# Patient Record
Sex: FEMALE | Race: WHITE | NOT HISPANIC OR LATINO | Employment: OTHER | URBAN - METROPOLITAN AREA
[De-identification: names, ages, dates, MRNs, and addresses within clinical notes are randomized per-mention and may not be internally consistent; named-entity substitution may affect disease eponyms.]

---

## 2017-05-24 ENCOUNTER — HOSPITAL ENCOUNTER (OUTPATIENT)
Facility: MEDICAL CENTER | Age: 22
End: 2017-05-24
Attending: NURSE PRACTITIONER
Payer: COMMERCIAL

## 2017-05-24 ENCOUNTER — OFFICE VISIT (OUTPATIENT)
Dept: MEDICAL GROUP | Facility: MEDICAL CENTER | Age: 22
End: 2017-05-24
Payer: COMMERCIAL

## 2017-05-24 VITALS
SYSTOLIC BLOOD PRESSURE: 112 MMHG | DIASTOLIC BLOOD PRESSURE: 54 MMHG | HEIGHT: 62 IN | BODY MASS INDEX: 28.16 KG/M2 | WEIGHT: 153 LBS | TEMPERATURE: 98.3 F | OXYGEN SATURATION: 100 % | HEART RATE: 74 BPM

## 2017-05-24 DIAGNOSIS — N89.8 VAGINAL IRRITATION: ICD-10-CM

## 2017-05-24 PROCEDURE — 99214 OFFICE O/P EST MOD 30 MIN: CPT | Performed by: NURSE PRACTITIONER

## 2017-05-24 PROCEDURE — 87591 N.GONORRHOEAE DNA AMP PROB: CPT

## 2017-05-24 PROCEDURE — 87491 CHLMYD TRACH DNA AMP PROBE: CPT

## 2017-05-24 PROCEDURE — 87510 GARDNER VAG DNA DIR PROBE: CPT

## 2017-05-24 PROCEDURE — 87480 CANDIDA DNA DIR PROBE: CPT

## 2017-05-24 PROCEDURE — 87660 TRICHOMONAS VAGIN DIR PROBE: CPT

## 2017-05-24 NOTE — MR AVS SNAPSHOT
"        Maria Luisa Jett   2017 3:00 PM   Office Visit   MRN: [id number]    Department:  [facility]   Dept Phone:  [phone]    Description:  Female : 1995   Provider:  RUBEN Camarena           Reason for Visit     Follow-Up x1 week w/pain.       Allergies as of 2017     Allergen Noted Reactions    Codeine 2008   Vomiting      Vital Signs     Blood Pressure Pulse Temperature Height Weight Body Mass Index    112/54 mmHg 74 36.8 °C (98.3 °F) 1.575 m (5' 2\") 69.4 kg (153 lb) 27.98 kg/m2    Oxygen Saturation Smoking Status                100% Never Smoker           Basic Information     Date Of Birth Sex Race Ethnicity Preferred Language    1995 Female White Non- English      Problem List              ICD-10-CM Priority Class Noted - Resolved    History of asthma Z87.09   9/10/2012 - Present    History of hypothyroidism Z86.39   2016 - Present    Uses birth control Z30.9   2016 - Present      Health Maintenance        Date Due Completion Dates    IMM VARICELLA (CHICKENPOX) VACCINE (2 of 2 - 2 Dose Childhood Series) 1997    PAP SMEAR 10/20/2017 10/20/2014 (Done), 2013, 9/10/2012    Override on 10/20/2014: Done    IMM DTaP/Tdap/Td Vaccine (5 - Td) 2026, 2013, 2005, 1997, 1995, 1994, 1993            Current Immunizations     DTP 1997, 1994, 1993    Dtap Vaccine 1995    HPV Quadrivalent Vaccine (GARDASIL) 2008, 2007, 10/9/2007    Hepatitis A Vaccine, Ped/Adol 2008, 10/9/2007    Hepatitis B Vaccine Non-Recombivax (Ped/Adol) 1994, 1994, 1993    Hib Vaccine (Prp-d) Historical Data 1995, 3/29/1994, 1994, 1993    MMR Vaccine 1997, 10/7/1994    Meningococcal Conjugate Vaccine MCV4 (Menactra) 2016, 10/9/2007    OPV - Historical Data 1997, 1994, 1994, 1993    Tdap Vaccine 2016, 2013    Varicella " Vaccine Live 9/30/1997    dT Vaccine 9/23/2005      Below and/or attached are the medications your provider expects you to take. Review all of your home medications and newly ordered medications with your provider and/or pharmacist. Follow medication instructions as directed by your provider and/or pharmacist. Please keep your medication list with you and share with your provider. Update the information when medications are discontinued, doses are changed, or new medications (including over-the-counter products) are added; and carry medication information at all times in the event of emergency situations     Allergies:  CODEINE - Vomiting               Medications  Valid as of: May 24, 2017 -  3:18 PM    Generic Name Brand Name Tablet Size Instructions for use    Levothyroxine Sodium (Tab) SYNTHROID 50 MCG TAKE 1 TABLET BY MOUTH EVERY DAY        maalox plus-benadryl-visc lidocaine (MAGIC MOUTHWASH) MAGIC MOUTHWASH  Take 5 mL by mouth every 6 hours as needed.        Norethin Ace-Eth Estrad-FE (Tab) MICROGESTIN FE1.5/30 1.5-30 MG-MCG Take 1 Tab by mouth every day.        .                 Medicines prescribed today were sent to:     [facility] - [address] [facility] [address]    Phone: [phone] Fax: [phone]    Open 24 Hours?: No      Medication refill instructions:       If your prescription bottle indicates you have medication refills left, it is not necessary to call your provider’s office. Please contact your pharmacy and they will refill your medication.    If your prescription bottle indicates you do not have any refills left, you may request refills at any time through one of the following ways: The online [employer] system (except Urgent Care), by calling your provider’s office, or by asking your pharmacy to contact your provider’s office with a refill request. Medication refills are processed only during regular business hours and may  not be available until the next business day. Your provider may request additional information or to have a follow-up visit with you prior to refilling your medication.   *Please Note: Medication refills are assigned a new Rx number when refilled electronically. Your pharmacy may indicate that no refills were authorized even though a new prescription for the same medication is available at the pharmacy. Please request the medicine by name with the pharmacy before contacting your provider for a refill.           Kelly [employer] Access Code: Activation code not generated  Current PulseOn Status: Active

## 2017-05-24 NOTE — PROGRESS NOTES
"Subjective:     Chief Complaint   Patient presents with   • Follow-Up     x1 week w/pain.      Maria Luisa Jett is a 21 y.o. female established patient here with concerns of vaginal irritation. This started one week ago, she is having burning as well as vaginal odor and increase in discharge. She has burning particularly when urine touches the vaginal tissue. No new sexual partners, she's been in a steady relationship. She is on birth control pill, current menses is lasting longer than usual. She is still having light bleeding at this point. No new hygeine products, using dove soap    Current medicines (including changes today)  Current Outpatient Prescriptions   Medication Sig Dispense Refill   • norethindrone-ethinyl estradiol-iron (MICROGESTIN FE1.5/30) 1.5-30 MG-MCG tablet Take 1 Tab by mouth every day. 12 Each 1   • maalox plus-benadryl-visc lidocaine (MAGIC MOUTHWASH) Take 5 mL by mouth every 6 hours as needed. 90 mL 0   • levothyroxine (SYNTHROID) 50 MCG Tab TAKE 1 TABLET BY MOUTH EVERY DAY 90 Tab 4     No current facility-administered medications for this visit.     She  has a past medical history of Ovarian cyst; ASTHMA; Urinary tract infection, site not specified; and Thyroid disease. She also has no past medical history of GERD (gastroesophageal reflux disease) or Diabetes.    ROS included above     Objective:     Blood pressure 112/54, pulse 74, temperature 36.8 °C (98.3 °F), height 1.575 m (5' 2\"), weight 69.4 kg (153 lb), SpO2 100 %. Body mass index is 27.98 kg/(m^2).     Physical Exam:  General: Alert, oriented in no acute distress.  Eye contact is good, speech is normal, affect calm  Lungs: clear to auscultation bilaterally, normal effort, no wheeze/ rhonchi/ rales.  CV: regular rate and rhythm, S1, S2  Pelvic: external genitalia pink, moist. No lesions. Vaginal canal with moderate blood discharge. Cervical os clear, no visible lesions. Vaginal swabs collected. Bimanual: uterus small, midline, no CMT, " no adnexal masses or tenderness.  Ext: no edema, color normal, vascularity normal, temperature normal    Assessment and Plan:   The following treatment plan was discussed   1. Vaginal irritation  BV versus yeast. Swabs sent including screening for gc/ct. Follow up pending results  CHLAMYDIA/GC PCR URINE OR SWAB    VAGINAL PATHOGENS DNA PANEL       Followup: pending labs         Please note that this dictation was created using voice recognition software. I have worked with consultants from the vendor as well as technical experts from [employer] [email or web address] to optimize the interface. I have made every reasonable attempt to correct obvious errors, but I expect that there are errors of grammar and possibly content that I did not discover before finalizing the note.

## 2017-05-25 DIAGNOSIS — Z01.419 ENCOUNTER FOR GYNECOLOGICAL EXAMINATION WITHOUT ABNORMAL FINDING: ICD-10-CM

## 2017-05-25 DIAGNOSIS — N89.8 VAGINAL IRRITATION: ICD-10-CM

## 2017-05-26 LAB
C TRACH DNA SPEC QL NAA+PROBE: NEGATIVE
CANDIDA DNA VAG QL PROBE+SIG AMP: NEGATIVE
G VAGINALIS DNA VAG QL PROBE+SIG AMP: NEGATIVE
N GONORRHOEA DNA SPEC QL NAA+PROBE: NEGATIVE
SPECIMEN SOURCE: NORMAL
T VAGINALIS DNA VAG QL PROBE+SIG AMP: NEGATIVE

## 2017-05-30 ENCOUNTER — TELEPHONE (OUTPATIENT)
Dept: MEDICAL GROUP | Facility: MEDICAL CENTER | Age: 22
End: 2017-05-30

## 2017-05-30 DIAGNOSIS — N89.8 VAGINAL IRRITATION: ICD-10-CM

## 2017-05-30 NOTE — TELEPHONE ENCOUNTER
----- Message from RUBEN Camarena sent at 5/30/2017  7:44 AM PDT -----  Please inform patient all labs are normal. If she is continuing to have irritation I can place referral for ob/gyn, please let me know

## 2017-05-30 NOTE — TELEPHONE ENCOUNTER
Please let her know if placed referral. If she continues to have bleeding with her current OCP we could try changing to a different pill. let me know if she is interested in a different prescription

## 2017-07-10 RX ORDER — NORETHINDRONE ACETATE AND ETHINYL ESTRADIOL 1.5-30(21)
1 KIT ORAL DAILY
Qty: 28 TAB | Refills: 11 | Status: SHIPPED | OUTPATIENT
Start: 2017-07-10 | End: 2018-06-01 | Stop reason: SDUPTHER

## 2017-07-10 NOTE — TELEPHONE ENCOUNTER
Was the patient seen in the last year in this department? Yes     Does patient have an active prescription for medications requested? No     Received Request Via: Patient LVM asking for refill.

## 2017-12-01 ENCOUNTER — OFFICE VISIT (OUTPATIENT)
Dept: MEDICAL GROUP | Facility: MEDICAL CENTER | Age: 22
End: 2017-12-01
Payer: COMMERCIAL

## 2017-12-01 ENCOUNTER — HOSPITAL ENCOUNTER (OUTPATIENT)
Facility: MEDICAL CENTER | Age: 22
End: 2017-12-01
Attending: NURSE PRACTITIONER
Payer: COMMERCIAL

## 2017-12-01 ENCOUNTER — HOSPITAL ENCOUNTER (OUTPATIENT)
Dept: LAB | Facility: MEDICAL CENTER | Age: 22
End: 2017-12-01
Attending: NURSE PRACTITIONER
Payer: COMMERCIAL

## 2017-12-01 VITALS
SYSTOLIC BLOOD PRESSURE: 122 MMHG | HEART RATE: 75 BPM | BODY MASS INDEX: 28.05 KG/M2 | HEIGHT: 62 IN | WEIGHT: 152.4 LBS | DIASTOLIC BLOOD PRESSURE: 68 MMHG | TEMPERATURE: 97.4 F | OXYGEN SATURATION: 99 %

## 2017-12-01 DIAGNOSIS — R42 VERTIGO: ICD-10-CM

## 2017-12-01 DIAGNOSIS — Z78.9 USES BIRTH CONTROL: ICD-10-CM

## 2017-12-01 DIAGNOSIS — Z01.419 ENCOUNTER FOR GYNECOLOGICAL EXAMINATION WITHOUT ABNORMAL FINDING: ICD-10-CM

## 2017-12-01 DIAGNOSIS — Z86.39 HISTORY OF HYPOTHYROIDISM: ICD-10-CM

## 2017-12-01 DIAGNOSIS — Z86.19 HISTORY OF HPV INFECTION: ICD-10-CM

## 2017-12-01 LAB
CYTOLOGY REG CYTOL: NORMAL
T4 FREE SERPL-MCNC: 0.94 NG/DL (ref 0.53–1.43)
TSH SERPL DL<=0.005 MIU/L-ACNC: 2.61 UIU/ML (ref 0.3–3.7)

## 2017-12-01 PROCEDURE — 88175 CYTOPATH C/V AUTO FLUID REDO: CPT

## 2017-12-01 PROCEDURE — 99395 PREV VISIT EST AGE 18-39: CPT | Performed by: NURSE PRACTITIONER

## 2017-12-01 PROCEDURE — 84439 ASSAY OF FREE THYROXINE: CPT

## 2017-12-01 PROCEDURE — 84443 ASSAY THYROID STIM HORMONE: CPT

## 2017-12-01 PROCEDURE — 36415 COLL VENOUS BLD VENIPUNCTURE: CPT

## 2017-12-01 NOTE — ASSESSMENT & PLAN NOTE
Brief episodes of vertigo since URI a few weeks ago  Typically happens when changing positions or with head movement, resolves after a few seconds  No associated nausea, no falls

## 2017-12-01 NOTE — PATIENT INSTRUCTIONS
Epley Maneuver Self-Care  WHAT IS THE EPLEY MANEUVER?  The Epley maneuver is an exercise you can do to relieve symptoms of benign paroxysmal positional vertigo (BPPV). This condition is often just referred to as vertigo. BPPV is caused by the movement of tiny crystals (canaliths) inside your inner ear. The accumulation and movement of canaliths in your inner ear causes a sudden spinning sensation (vertigo) when you move your head to certain positions. Vertigo usually lasts about 30 seconds. BPPV usually occurs in just one ear. If you get vertigo when you lie on your left side, you probably have BPPV in your left ear. Your health care provider can tell you which ear is involved.   BPPV may be caused by a head injury. Many people older than 50 get BPPV for unknown reasons. If you have been diagnosed with BPPV, your health care provider may teach you how to do this maneuver. BPPV is not life threatening (benign) and usually goes away in time.   WHEN SHOULD I PERFORM THE EPLEY MANEUVER?  You can do this maneuver at home whenever you have symptoms of vertigo. You may do the Epley maneuver up to 3 times a day until your symptoms of vertigo go away.  HOW SHOULD I DO THE EPLEY MANEUVER?  1. Sit on the edge of a bed or table with your back straight. Your legs should be extended or hanging over the edge of the bed or table.    2. Turn your head intermediate toward the affected ear.    3. Lie backward quickly with your head turned until you are lying flat on your back. You may want to position a pillow under your shoulders.    4. Hold this position for 30 seconds. You may experience an attack of vertigo. This is normal. Hold this position until the vertigo stops.  5. Then turn your head to the opposite direction until your unaffected ear is facing the floor.    6. Hold this position for 30 seconds. You may experience an attack of vertigo. This is normal. Hold this position until the vertigo stops.  7. Now turn your whole body to  the same side as your head. Hold for another 30 seconds.    8. You can then sit back up.  ARE THERE RISKS TO THIS MANEUVER?  In some cases, you may have other symptoms (such as changes in your vision, weakness, or numbness). If you have these symptoms, stop doing the maneuver and call your health care provider. Even if doing these maneuvers relieves your vertigo, you may still have dizziness. Dizziness is the sensation of light-headedness but without the sensation of movement. Even though the Epley maneuver may relieve your vertigo, it is possible that your symptoms will return within 5 years.  WHAT SHOULD I DO AFTER THIS MANEUVER?  After doing the Epley maneuver, you can return to your normal activities. Ask your doctor if there is anything you should do at home to prevent vertigo. This may include:  · Sleeping with two or more pillows to keep your head elevated.  · Not sleeping on the side of your affected ear.  · Getting up slowly from bed.  · Avoiding sudden movements during the day.  · Avoiding extreme head movement, like looking up or bending over.  · Wearing a cervical collar to prevent sudden head movements.  WHAT SHOULD I DO IF MY SYMPTOMS GET WORSE?  Call your health care provider if your vertigo gets worse. Call your provider right way if you have other symptoms, including:   · Nausea.  · Vomiting.  · Headache.  · Weakness.  · Numbness.  · Vision changes.     This information is not intended to replace advice given to you by your health care provider. Make sure you discuss any questions you have with your health care provider.     Document Released: 12/23/2014 Document Reviewed: 12/23/2014  [employer] Interactive Patient Education ©2016 [employer]

## 2017-12-01 NOTE — PROGRESS NOTES
CC:  Pap/Well Woman Exam    History of present illness:  Maria Luisa Jett is 22 y.o.  female presenting today for well woman exam with gynecological exam and Pap smear.  Last Pap about 3 years ago, normal. She is doing Pilates and sometimes running for exercise. Admits that her diet could use improvement. Engaged, same partner for the last several years. No concerns for STDs. We discussed:  Vertigo  Brief episodes of vertigo since URI a few weeks ago  Typically happens when changing positions or with head movement, resolves after a few seconds  No associated nausea, no falls    History of HPV infection  Reportedly tested positive for HPV in ,  Pap the following year was then normal    Uses birth control  Continues doing well with oral contraceptive, no plans for children in the next several years  Last menstrual period one week ago, no significant dysmenorrhea or menorrhagia. No bleeding between periods    History of hypothyroidism  Daily at one point, has been off for over a year now. Last TSH and T4 in normal range  She does tend to get some afternoon fatigue but reports that she is getting up at about 4:30 in the morning to exercise. Her diet has not been very healthy  No constipation, hair loss, heat or cold intolerance    Past Medical History:   Diagnosis Date   • ASTHMA    • Ovarian cyst    • Thyroid disease     hypothyroidism   • Urinary tract infection, site not specified        Past Surgical History:   Procedure Laterality Date   • TONSILLECTOMY     • TONSILLECTOMY AND ADENOIDECTOMY         Outpatient Encounter Prescriptions as of 2017   Medication Sig Dispense Refill   • norethindrone-ethinyl estradiol-iron (MICROGESTIN FE1.5/30) 1.5-30 MG-MCG tablet Take 1 Tab by mouth every day. 28 Tab 11   • [DISCONTINUED] maalox plus-benadryl-visc lidocaine (MAGIC MOUTHWASH) Take 5 mL by mouth every 6 hours as needed. 90 mL 0   • [DISCONTINUED] levothyroxine (SYNTHROID) 50 MCG Tab TAKE 1 TABLET BY MOUTH  "EVERY DAY 90 Tab 4     No facility-administered encounter medications on file as of 12/1/2017.        Patient Active Problem List    Diagnosis Date Noted   • History of HPV infection 12/01/2017   • Vertigo 12/01/2017   • History of hypothyroidism 12/28/2016   • Uses birth control 12/28/2016   • History of asthma 09/10/2012       .  Social History     Social History   • Marital status: Single     Spouse name: N/A   • Number of children: N/A   • Years of education: N/A     Occupational History   • Not on file.     Social History Main Topics   • Smoking status: Never Smoker   • Smokeless tobacco: Never Used   • Alcohol use 0.0 oz/week   • Drug use: No   • Sexual activity: Yes     Partners: Male     Birth control/ protection: Pill      Comment: LMP: april 7, 10     Other Topics Concern   • Not on file     Social History Narrative   • No narrative on file       Family History   Problem Relation Age of Onset   • Arthritis Mother    • Lung Disease Mother    • Other Mother      sarcoidosis   • Thyroid Mother    • Cancer Maternal Grandmother      Throat   • Cancer Maternal Grandfather      Liver   • Alcohol/Drug Maternal Grandfather    • Diabetes Paternal Grandmother    • Heart Disease Paternal Grandmother    • Hypertension Paternal Grandmother    • Hyperlipidemia Paternal Grandmother    • Alcohol/Drug Father          ROS: Denies Weight loss, fatigue, chest pain, SOB, bowel or bladder changes. No significant dysmenorrhea, concerning vaginal discharge or irritation, no dyspareunia or postcoital bleeding. Denies h/o migraine with aura. Denies musculoskeletal, neurological, or psychiatric problems.      /68   Pulse 75   Temp 36.3 °C (97.4 °F)   Ht 1.575 m (5' 2\")   Wt 69.1 kg (152 lb 6.4 oz)   LMP 11/22/2017   SpO2 99%   Breastfeeding? No   BMI 27.87 kg/m²     GEN:  Appears well and in no apparent distress   HEENT:  left TM normal with good landmarks. Right TM with clear effusion, no redness or bulging. Neck " Supple without adenopathy or thyromegaly  LUNGS:  Clear and equal. No wheeze, ronchi, or rales.  CV:  RRR, S1, S2. No murmur.  Pedal pulses 2+ bilaterally.  BREAST:  Symmetrical without masses. No nipple discharge.  ABD:  Soft, non-tender, non-distended, normal bowel sounds.  No hepatosplenomegaly.  :  Normal external female genitalia.  Vaginal canal clear.  Cervix appears normal. Specimen collected from transformation zone. Bimanual exam:  No CMT, normal size uterus without masses or tenderness; no adnexal masses or tenderness.      Assessment and plan    1. Encounter for gynecological examination without abnormal finding  Normal exam. Pap sent, follow-up pending results. Breast self-exam taught and encouraged monthly, general health and wellness discussion including healthy diet and regular exercise. No family history of breast cancer, will plan to start mammogram at age 38. No plans for pregnancy in the next several years  - THINPREP PAP, REFLEX HPV ON ASC-US AND ABOVE; Future  2. History of HPV infection  2013 with follow-up testing being negative  3. History of hypothyroidism  Previously on levothyroxine, off medication the last year. Last levels normal.  - TSH+FREE T4  4. Vertigo  Brief episodes of vertigo with head movement. She does have clear effusion on the right. No significant nausea. Printed instructions for Epley maneuver provided, follow-up if not gradually improving  5. Uses birth control  Doing well with current OCP      F/u pending results

## 2017-12-01 NOTE — ASSESSMENT & PLAN NOTE
Daily at one point, has been off for over a year now. Last TSH and T4 in normal range  She does tend to get some afternoon fatigue but reports that she is getting up at about 4:30 in the morning to exercise. Her diet has not been very healthy

## 2017-12-01 NOTE — ASSESSMENT & PLAN NOTE
Continues doing well with oral contraceptive, no plans for children in the next several years  Last menstrual period one week ago, no significant dysmenorrhea or menorrhagia. No bleeding between periods

## 2018-02-12 ENCOUNTER — OFFICE VISIT (OUTPATIENT)
Dept: MEDICAL GROUP | Facility: MEDICAL CENTER | Age: 23
End: 2018-02-12
Payer: COMMERCIAL

## 2018-02-12 ENCOUNTER — HOSPITAL ENCOUNTER (OUTPATIENT)
Dept: LAB | Facility: MEDICAL CENTER | Age: 23
End: 2018-02-12
Attending: NURSE PRACTITIONER
Payer: COMMERCIAL

## 2018-02-12 VITALS
BODY MASS INDEX: 28.3 KG/M2 | TEMPERATURE: 97.2 F | WEIGHT: 153.8 LBS | HEART RATE: 72 BPM | HEIGHT: 62 IN | SYSTOLIC BLOOD PRESSURE: 128 MMHG | RESPIRATION RATE: 12 BRPM | DIASTOLIC BLOOD PRESSURE: 84 MMHG | OXYGEN SATURATION: 100 %

## 2018-02-12 DIAGNOSIS — Z20.828 HERPES EXPOSURE: ICD-10-CM

## 2018-02-12 DIAGNOSIS — N89.8 VAGINAL LESION: ICD-10-CM

## 2018-02-12 PROCEDURE — 36415 COLL VENOUS BLD VENIPUNCTURE: CPT

## 2018-02-12 PROCEDURE — 86695 HERPES SIMPLEX TYPE 1 TEST: CPT

## 2018-02-12 PROCEDURE — 99213 OFFICE O/P EST LOW 20 MIN: CPT | Performed by: NURSE PRACTITIONER

## 2018-02-12 PROCEDURE — 86696 HERPES SIMPLEX TYPE 2 TEST: CPT

## 2018-02-12 PROCEDURE — 86694 HERPES SIMPLEX NES ANTBDY: CPT

## 2018-02-12 ASSESSMENT — PATIENT HEALTH QUESTIONNAIRE - PHQ9: CLINICAL INTERPRETATION OF PHQ2 SCORE: 0

## 2018-02-13 NOTE — ASSESSMENT & PLAN NOTE
Carla has HSV, on suppressive therapy  Has had repeated vaginal lesions recently- once in Nov and then again monthly since that time. Small raised lesion with drainage, somewhat painful. Wondered initially if it was an ingrown hair, but with it recurring she is now concerned about HSV.   She has no current lesions. No vaginal or pelvic pain, pain with intercourse. No fever or malaise

## 2018-02-13 NOTE — PROGRESS NOTES
"Subjective:     Chief Complaint   Patient presents with   • Exposure to STD     testing     Maria Luisa Jett is a 22 y.o. female here today to follow up on:    Exposure to herpes  Fiance has HSV, on suppressive therapy  Has had repeated vaginal lesions recently- once in Nov and then again monthly since that time. Small raised lesion with drainage, somewhat painful. Wondered initially if it was an ingrown hair, but with it recurring she is now concerned about HSV.   She has no current lesions. No vaginal or pelvic pain, pain with intercourse. No fever or malaise       Current medicines (including changes today)  Current Outpatient Prescriptions   Medication Sig Dispense Refill   • norethindrone-ethinyl estradiol-iron (MICROGESTIN FE1.5/30) 1.5-30 MG-MCG tablet Take 1 Tab by mouth every day. 28 Tab 11     No current facility-administered medications for this visit.      She  has a past medical history of ASTHMA; Ovarian cyst; Thyroid disease; and Urinary tract infection, site not specified. She also has no past medical history of Diabetes or GERD (gastroesophageal reflux disease).    ROS included above     Objective:     Blood pressure 128/84, pulse 72, temperature 36.2 °C (97.2 °F), resp. rate 12, height 1.575 m (5' 2\"), weight 69.8 kg (153 lb 12.8 oz), SpO2 100 %, not currently breastfeeding. Body mass index is 28.13 kg/m².     Physical Exam:  General: Alert, oriented in no acute distress.  Eye contact is good, speech is normal, affect calm  Lungs: clear to auscultation bilaterally, normal effort, no wheeze/ rhonchi/ rales.  CV: regular rate and rhythm, S1, S2, no murmur  Abdomen: soft, nontender  Ext: no edema, color normal, vascularity normal, temperature normal    Assessment and Plan:   The following treatment plan was discussed   1. Vaginal lesion  Recurrent vaginal lesions the last few months with drainage and discomfort. Partner with known HSV. Discussed limitation of serology for HSV testing, explaining " it may show exposure in patients that do not experience outbreak. Encouraged to come in when having symptoms for viral culture which would be more definitive, she verbalizes understanding. . Will followup with her pending labs.  HSV TYPE SPECIFIC IMMUNOBLOT   2. Herpes exposure  HSV TYPE SPECIFIC IMMUNOBLOT       Followup: pending labs       Please note that this dictation was created using voice recognition software. I have worked with consultants from the vendor as well as technical experts from [facility] to optimize the interface. I have made every reasonable attempt to correct obvious errors, but I expect that there are errors of grammar and possibly content that I did not discover before finalizing the note.

## 2018-02-14 LAB
HSV1 GG IGG SER-ACNC: 0.05 IV
HSV1+2 IGG SER IA-ACNC: 11.1 IV
HSV2 GG IGG SER-ACNC: 16.1 IV

## 2018-02-15 ENCOUNTER — PATIENT MESSAGE (OUTPATIENT)
Dept: MEDICAL GROUP | Facility: MEDICAL CENTER | Age: 23
End: 2018-02-15

## 2018-02-15 RX ORDER — VALACYCLOVIR HYDROCHLORIDE 500 MG/1
500 TABLET, FILM COATED ORAL 2 TIMES DAILY
Qty: 60 TAB | Refills: 5 | Status: SHIPPED | OUTPATIENT
Start: 2018-02-15 | End: 2018-09-11 | Stop reason: SDUPTHER

## 2018-02-15 NOTE — TELEPHONE ENCOUNTER
From: Maria Luisa Jett  To: RUBEN Camarena  Sent: 2/15/2018 11:23 AM PST  Subject: Prescription Question    I’m regards to my recent test results and Dr. Corrales’s message: Yes I would like to try a suppressive medication.

## 2018-06-01 RX ORDER — NORETHINDRONE ACETATE AND ETHINYL ESTRADIOL 1.5-30(21)
1 KIT ORAL DAILY
Qty: 28 TAB | Refills: 5 | Status: SHIPPED | OUTPATIENT
Start: 2018-06-01 | End: 2018-06-13 | Stop reason: SDUPTHER

## 2018-06-13 NOTE — TELEPHONE ENCOUNTER
Change in pharmacy   Was the patient seen in the last year in this department? Yes     Does patient have an active prescription for medications requested? No     Received Request Via: Patient

## 2018-06-13 NOTE — TELEPHONE ENCOUNTER
----- Message from Winter Khan sent at 6/12/2018  5:35 PM PDT -----  Regarding: BC Refill  Contact: [phone]  Hi there, patient called requesting her birth control refill. The pharmacy has been been trying to get this filled . It looks like it may have gone to wrong pharmacy. Send to [facility] for her birth control prescription. Any questions plz call number above. I dont have [employer] set up in my email yet. Sorry.

## 2018-06-14 RX ORDER — NORETHINDRONE ACETATE AND ETHINYL ESTRADIOL 1.5-30(21)
1 KIT ORAL DAILY
Qty: 28 TAB | Refills: 5 | Status: SHIPPED
Start: 2018-06-14 | End: 2020-02-19

## 2018-06-14 NOTE — TELEPHONE ENCOUNTER
It was change from the [facility]'s pharmacy to the [facility] . Same address of  [facility] [place] [address]

## 2018-09-11 RX ORDER — VALACYCLOVIR HYDROCHLORIDE 500 MG/1
500 TABLET, FILM COATED ORAL 2 TIMES DAILY
Qty: 60 TAB | Refills: 5 | Status: SHIPPED | OUTPATIENT
Start: 2018-09-11 | End: 2019-03-26 | Stop reason: SDUPTHER

## 2018-09-24 ENCOUNTER — PATIENT MESSAGE (OUTPATIENT)
Dept: MEDICAL GROUP | Facility: MEDICAL CENTER | Age: 23
End: 2018-09-24

## 2018-10-18 ENCOUNTER — OFFICE VISIT (OUTPATIENT)
Dept: URGENT CARE | Facility: PHYSICIAN GROUP | Age: 23
End: 2018-10-18
Payer: COMMERCIAL

## 2018-10-18 VITALS
DIASTOLIC BLOOD PRESSURE: 70 MMHG | RESPIRATION RATE: 16 BRPM | HEART RATE: 80 BPM | WEIGHT: 145.4 LBS | HEIGHT: 62 IN | SYSTOLIC BLOOD PRESSURE: 132 MMHG | OXYGEN SATURATION: 100 % | TEMPERATURE: 97.2 F | BODY MASS INDEX: 26.76 KG/M2

## 2018-10-18 DIAGNOSIS — Z20.818 EXPOSURE TO STREP THROAT: ICD-10-CM

## 2018-10-18 DIAGNOSIS — J02.9 ACUTE PHARYNGITIS, UNSPECIFIED ETIOLOGY: ICD-10-CM

## 2018-10-18 DIAGNOSIS — N39.0 ACUTE URINARY TRACT INFECTION: ICD-10-CM

## 2018-10-18 LAB
APPEARANCE UR: CLEAR
BILIRUB UR STRIP-MCNC: NEGATIVE MG/DL
COLOR UR AUTO: YELLOW
GLUCOSE UR STRIP.AUTO-MCNC: NEGATIVE MG/DL
INT CON NEG: NORMAL
INT CON NEG: NORMAL
INT CON POS: NORMAL
INT CON POS: NORMAL
KETONES UR STRIP.AUTO-MCNC: NEGATIVE MG/DL
LEUKOCYTE ESTERASE UR QL STRIP.AUTO: NORMAL
NITRITE UR QL STRIP.AUTO: NEGATIVE
PH UR STRIP.AUTO: 6 [PH] (ref 5–8)
POC URINE PREGNANCY TEST: NEGATIVE
PROT UR QL STRIP: NEGATIVE MG/DL
RBC UR QL AUTO: NORMAL
S PYO AG THROAT QL: NEGATIVE
SP GR UR STRIP.AUTO: 1.01
UROBILINOGEN UR STRIP-MCNC: 0.2 MG/DL

## 2018-10-18 PROCEDURE — 81025 URINE PREGNANCY TEST: CPT | Performed by: FAMILY MEDICINE

## 2018-10-18 PROCEDURE — 81002 URINALYSIS NONAUTO W/O SCOPE: CPT | Performed by: FAMILY MEDICINE

## 2018-10-18 PROCEDURE — 99214 OFFICE O/P EST MOD 30 MIN: CPT | Performed by: FAMILY MEDICINE

## 2018-10-18 PROCEDURE — 87880 STREP A ASSAY W/OPTIC: CPT | Performed by: FAMILY MEDICINE

## 2018-10-18 RX ORDER — PHENAZOPYRIDINE HYDROCHLORIDE 200 MG/1
TABLET, FILM COATED ORAL
Qty: 9 TAB | Refills: 0 | Status: SHIPPED | OUTPATIENT
Start: 2018-10-18 | End: 2019-01-23

## 2018-10-18 RX ORDER — CEFDINIR 300 MG/1
CAPSULE ORAL
Qty: 20 CAP | Refills: 0 | Status: SHIPPED | OUTPATIENT
Start: 2018-10-18 | End: 2019-01-23

## 2018-10-18 NOTE — LETTER
October 18, 2018         Patient: Maria Luisa Dodge   YOB: 1995   Date of Visit: 10/18/2018           To Whom it May Concern:    Maria Luisa Dodge was seen in my clinic on 10/18/2018.     Please excuse from work for 10/18 and 10/19/18 due to medical condition.    May return on 10/19/18 if feeling better.    If you have any questions or concerns, please don't hesitate to call.        Sincerely,           True Escalera M.D.  Electronically Signed

## 2018-10-18 NOTE — PROGRESS NOTES
Chief Complaint:    Chief Complaint   Patient presents with   • UTI       History of Present Illness:    These are new problems.     Symptoms x 1 month. Has dysuria and urinary frequency. Used to get UTIs more frequently when was younger, but has not had for many years.    She has sore throat since 10/16/18. 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 She gets Strep throat occasionally and feels like she might have it. No nasal symptoms or cough.      Review of Systems:    Constitutional: Negative for fever, chills, and diaphoresis.   Eyes: Negative for change in vision, photophobia, pain, redness, and discharge.  ENT: See HPI.    Respiratory: Negative for cough, hemoptysis, sputum production, shortness of breath, wheezing, and stridor.    Cardiovascular: Negative for chest pain, palpitations, orthopnea, claudication, leg swelling, and PND.   Gastrointestinal: Negative for abdominal pain, nausea, vomiting, diarrhea, constipation, blood in stool, and melena.   Genitourinary: See HPI.  Musculoskeletal: Negative for myalgias, joint pain, neck pain, and back pain.   Skin: Negative for rash and itching.   Neurological: Negative for dizziness, tingling, tremors, sensory change, speech change, focal weakness, seizures, loss of consciousness, and headaches.   Endo: Negative for polydipsia.   Heme: Does not bruise/bleed easily.   Psychiatric/Behavioral: Negative for depression, suicidal ideas, hallucinations, memory loss and substance abuse. The patient is not nervous/anxious and does not have insomnia.        Past Medical History:    Past Medical History:   Diagnosis Date   • ASTHMA    • Ovarian cyst    • Thyroid disease     hypothyroidism   • Urinary tract infection, site not specified      Past Surgical History:    Past Surgical History:   Procedure Laterality Date   • TONSILLECTOMY     • TONSILLECTOMY AND ADENOIDECTOMY       Social History:    Social History     Social History   • Marital  "status: Single     Spouse name: N/A   • Number of children: N/A   • Years of education: N/A     Occupational History   • Not on file.     Social History Main Topics   • Smoking status: Never Smoker   • Smokeless tobacco: Never Used   • Alcohol use 0.0 oz/week      Comment: occ   • Drug use: No   • Sexual activity: Yes     Partners: Male     Birth control/ protection: Pill      Comment: LMP: april 7, 10     Other Topics Concern   • Not on file     Social History Narrative   • No narrative on file     Family History:    Family History   Problem Relation Age of Onset   • Arthritis Mother    • Lung Disease Mother    • Other Mother         sarcoidosis   • Thyroid Mother    • Cancer Maternal Grandmother         Throat   • Cancer Maternal Grandfather         Liver   • Alcohol/Drug Maternal Grandfather    • Diabetes Paternal Grandmother    • Heart Disease Paternal Grandmother    • Hypertension Paternal Grandmother    • Hyperlipidemia Paternal Grandmother    • Alcohol/Drug Father      Medications:    Current Outpatient Prescriptions on File Prior to Visit   Medication Sig Dispense Refill   • valACYclovir (VALTREX) 500 MG Tab Take 1 Tab by mouth 2 times a day. 60 Tab 5   • norethindrone-ethinyl estradiol-iron (MICROGESTIN FE1.5/30) 1.5-30 MG-MCG tablet Take 1 Tab by mouth every day. 28 Tab 5     No current facility-administered medications on file prior to visit.      Allergies:    Allergies   Allergen Reactions   • Codeine Vomiting       Vitals:    Vitals:    10/18/18 1312   BP: 132/70   Pulse: 80   Resp: 16   Temp: 36.2 °C (97.2 °F)   SpO2: 100%   Weight: 66 kg (145 lb 6.4 oz)   Height: 1.575 m (5' 2\")       Physical Exam:    Constitutional: Vital signs reviewed. Appears well-developed and well-nourished. No acute distress.   Eyes: Sclera white, conjunctivae clear.   ENT: External ears normal. Hearing normal. Nasal mucosa pink. Lips/teeth are normal. Oral mucosa pink and moist. Posterior pharynx: mildly erythematous.  Neck: " Neck supple.   Pulmonary/Chest: Respirations non-labored.   Abdomen: Bowel sounds are normal active. Soft, non-distended, and non-tender to palpation.   Lymph: Cervical nodes without tenderness or enlargement.  Musculoskeletal: No CVA TTP bilaterally. Normal gait. Normal range of motion. No muscular atrophy or weakness.  Neurological: Alert and oriented to person, place, and time. Muscle tone normal. Coordination normal. Light touch and sensation normal.  Skin: No rashes or lesions. Warm, dry, normal turgor.  Psychiatric: Normal mood and affect. Behavior is normal. Judgment and thought content normal.     Diagnostics:    POCT URINALYSIS (Order #[id number]) on 10/18/18   Component Results     Component Value Ref Range & Units Status   POC Color yellow  Negative Final   POC Appearance clear  Negative Final   POC Leukocyte Esterase small  Negative Final   POC Nitrites negative  Negative Final   POC Urobiligen 0.2  Negative (0.2) mg/dL Final   POC Protein negative  Negative mg/dL Final   POC Urine PH 6.0  5.0 - 8.0 Final   POC Blood moderate  Negative Final   POC Specific Gravity 1.015  <1.005 - >1.030 Final   POC Ketones negative  Negative mg/dL Final   POC Bilirubin negative  Negative mg/dL Final   POC Glucose negative  Negative mg/dL Final   Last Resulted Time   Thu Oct 18, 2018  1:26 PM     POCT PREGNANCY (Order #[id number]) on 10/18/18   Component Results     Component Value Ref Range & Units Status   POC Urine Pregnancy Test negative  Negative Final   Internal Control Positive Valid   Final   Internal Control Negative Valid   Final   Last Resulted Time   Thu Oct 18, 2018  1:26 PM     POCT RAPID STREP A (Order #[id number]) on 10/18/18   Component Results     Component   Rapid Strep Screen   negative    Internal Control Positive   Valid    Internal Control Negative   Valid    Last Resulted Time   Thu Oct 18, 2018  1:40 PM       Assessment / Plan:    1. Acute urinary tract infection  - POCT Urinalysis  - POCT  PREGNANCY  - phenazopyridine (PYRIDIUM) 200 MG Tab; 1 TAB UP TO 3 TIMES A DAY ONLY IF NEEDED FOR BLADDER OR URINARY PAIN. WILL TURN URINE ORANGE.  Dispense: 9 Tab; Refill: 0  - cefdinir (OMNICEF) 300 MG Cap; 1 CAP BY MOUTH TWICE A DAY X 5-10 DAYS.  Dispense: 20 Cap; Refill: 0    2. Acute pharyngitis, unspecified etiology  - POCT Rapid Strep A  - cefdinir (OMNICEF) 300 MG Cap; 1 CAP BY MOUTH TWICE A DAY X 5-10 DAYS.  Dispense: 20 Cap; Refill: 0    3. Exposure to strep throat  - POCT Rapid Strep A  - cefdinir (OMNICEF) 300 MG Cap; 1 CAP BY MOUTH TWICE A DAY X 5-10 DAYS.  Dispense: 20 Cap; Refill: 0      Work note given - excuse for 10/18 and 10/19/18. May return on 10/19/18 if feeling better.    Discussed with her limitations of Rapid Strep test (possible false negative, only testing for Strep A), DDX, management options, and risks, benefits, and alternatives to treatment plan agreed upon.    Declines urine culture and Throat culture.    She feels she could have Strep Throat, so I will have her take antibiotic that can cover for both.    She understands throat symptoms may be viral etiology and if not improving at all in 2-3 days with antibiotic, likely is viral etiology that will eventually self-resolve.    Agreeable to medications prescribed.    Discussed expected course of duration, time for improvement, and to seek follow-up in Emergency Room, urgent care, or with PCP if getting worse at any time or not improving within expected time frame.

## 2019-01-23 ENCOUNTER — OFFICE VISIT (OUTPATIENT)
Dept: MEDICAL GROUP | Facility: MEDICAL CENTER | Age: 24
End: 2019-01-23
Payer: COMMERCIAL

## 2019-01-23 VITALS
SYSTOLIC BLOOD PRESSURE: 136 MMHG | DIASTOLIC BLOOD PRESSURE: 86 MMHG | WEIGHT: 140 LBS | RESPIRATION RATE: 16 BRPM | BODY MASS INDEX: 25.76 KG/M2 | HEART RATE: 87 BPM | TEMPERATURE: 97.8 F | HEIGHT: 62 IN | OXYGEN SATURATION: 98 %

## 2019-01-23 DIAGNOSIS — R42 LIGHTHEADEDNESS: ICD-10-CM

## 2019-01-23 DIAGNOSIS — Z83.2 FAMILY HISTORY OF SARCOIDOSIS: ICD-10-CM

## 2019-01-23 DIAGNOSIS — R51.9 FREQUENT HEADACHES: ICD-10-CM

## 2019-01-23 DIAGNOSIS — Z86.39 HISTORY OF HYPOTHYROIDISM: ICD-10-CM

## 2019-01-23 DIAGNOSIS — G43.909 MIGRAINE SYNDROME: ICD-10-CM

## 2019-01-23 DIAGNOSIS — M70.62 GREATER TROCHANTERIC BURSITIS OF LEFT HIP: ICD-10-CM

## 2019-01-23 PROCEDURE — 99214 OFFICE O/P EST MOD 30 MIN: CPT | Performed by: NURSE PRACTITIONER

## 2019-01-23 ASSESSMENT — PATIENT HEALTH QUESTIONNAIRE - PHQ9: CLINICAL INTERPRETATION OF PHQ2 SCORE: 0

## 2019-01-23 NOTE — ASSESSMENT & PLAN NOTE
"She has had ongoing concerns of sudden episodes of lightheadedness which are followed by vision distortion and headache.  These come on without warning, typically lasts 10-15 minutes and then resolve spontaneously.  Her vision is \"pixilated\" during these episodes.  Her headache is fairly severe.  This is occurring about once a week and interferes with her activity, she had an episode driving and had to pull over  She denies nausea, vomiting, disorientation, gait difficulty, shortness of breath, tachycardia, presyncope.  She has had minor headaches in the past, no diagnosed history of migraine  She does have history of hypothyroidism, was on levothyroxine at one point but has been off medication now for quite some time.  No significant weight change, fatigue.  She does state that she is \"always cold\"  Mother has history of sarcoidosis  "

## 2019-01-26 ENCOUNTER — HOSPITAL ENCOUNTER (OUTPATIENT)
Dept: LAB | Facility: MEDICAL CENTER | Age: 24
End: 2019-01-26
Attending: NURSE PRACTITIONER
Payer: COMMERCIAL

## 2019-01-26 DIAGNOSIS — R51.9 FREQUENT HEADACHES: ICD-10-CM

## 2019-01-26 DIAGNOSIS — R42 LIGHTHEADEDNESS: ICD-10-CM

## 2019-01-26 DIAGNOSIS — Z83.2 FAMILY HISTORY OF SARCOIDOSIS: ICD-10-CM

## 2019-01-26 DIAGNOSIS — Z86.39 HISTORY OF HYPOTHYROIDISM: ICD-10-CM

## 2019-01-26 LAB
ALBUMIN SERPL BCP-MCNC: 4.5 G/DL (ref 3.2–4.9)
ALBUMIN/GLOB SERPL: 1.7 G/DL
ALP SERPL-CCNC: 27 U/L (ref 30–99)
ALT SERPL-CCNC: 18 U/L (ref 2–50)
ANION GAP SERPL CALC-SCNC: 8 MMOL/L (ref 0–11.9)
AST SERPL-CCNC: 21 U/L (ref 12–45)
BASOPHILS # BLD AUTO: 0.9 % (ref 0–1.8)
BASOPHILS # BLD: 0.04 K/UL (ref 0–0.12)
BILIRUB SERPL-MCNC: 1.3 MG/DL (ref 0.1–1.5)
BUN SERPL-MCNC: 14 MG/DL (ref 8–22)
CALCIUM SERPL-MCNC: 9.2 MG/DL (ref 8.5–10.5)
CHLORIDE SERPL-SCNC: 108 MMOL/L (ref 96–112)
CO2 SERPL-SCNC: 24 MMOL/L (ref 20–33)
CREAT SERPL-MCNC: 0.99 MG/DL (ref 0.5–1.4)
EOSINOPHIL # BLD AUTO: 0.08 K/UL (ref 0–0.51)
EOSINOPHIL NFR BLD: 1.7 % (ref 0–6.9)
ERYTHROCYTE [DISTWIDTH] IN BLOOD BY AUTOMATED COUNT: 44.3 FL (ref 35.9–50)
ERYTHROCYTE [SEDIMENTATION RATE] IN BLOOD BY WESTERGREN METHOD: 4 MM/HOUR (ref 0–20)
GLOBULIN SER CALC-MCNC: 2.7 G/DL (ref 1.9–3.5)
GLUCOSE SERPL-MCNC: 77 MG/DL (ref 65–99)
HCT VFR BLD AUTO: 42.6 % (ref 37–47)
HGB BLD-MCNC: 14.2 G/DL (ref 12–16)
IMM GRANULOCYTES # BLD AUTO: 0.02 K/UL (ref 0–0.11)
IMM GRANULOCYTES NFR BLD AUTO: 0.4 % (ref 0–0.9)
LYMPHOCYTES # BLD AUTO: 1.6 K/UL (ref 1–4.8)
LYMPHOCYTES NFR BLD: 34.9 % (ref 22–41)
MCH RBC QN AUTO: 33.9 PG (ref 27–33)
MCHC RBC AUTO-ENTMCNC: 33.3 G/DL (ref 33.6–35)
MCV RBC AUTO: 101.7 FL (ref 81.4–97.8)
MONOCYTES # BLD AUTO: 0.33 K/UL (ref 0–0.85)
MONOCYTES NFR BLD AUTO: 7.2 % (ref 0–13.4)
NEUTROPHILS # BLD AUTO: 2.51 K/UL (ref 2–7.15)
NEUTROPHILS NFR BLD: 54.9 % (ref 44–72)
NRBC # BLD AUTO: 0 K/UL
NRBC BLD-RTO: 0 /100 WBC
PLATELET # BLD AUTO: 252 K/UL (ref 164–446)
PMV BLD AUTO: 10 FL (ref 9–12.9)
POTASSIUM SERPL-SCNC: 4.4 MMOL/L (ref 3.6–5.5)
PROT SERPL-MCNC: 7.2 G/DL (ref 6–8.2)
RBC # BLD AUTO: 4.19 M/UL (ref 4.2–5.4)
SODIUM SERPL-SCNC: 140 MMOL/L (ref 135–145)
T3 SERPL-MCNC: 132.9 NG/DL (ref 60–181)
T4 FREE SERPL-MCNC: 0.97 NG/DL (ref 0.53–1.43)
THYROPEROXIDASE AB SERPL-ACNC: 2.7 IU/ML (ref 0–9)
TSH SERPL DL<=0.005 MIU/L-ACNC: 2.33 UIU/ML (ref 0.38–5.33)
WBC # BLD AUTO: 4.6 K/UL (ref 4.8–10.8)

## 2019-01-26 PROCEDURE — 84439 ASSAY OF FREE THYROXINE: CPT

## 2019-01-26 PROCEDURE — 84480 ASSAY TRIIODOTHYRONINE (T3): CPT

## 2019-01-26 PROCEDURE — 86376 MICROSOMAL ANTIBODY EACH: CPT

## 2019-01-26 PROCEDURE — 85652 RBC SED RATE AUTOMATED: CPT

## 2019-01-26 PROCEDURE — 36415 COLL VENOUS BLD VENIPUNCTURE: CPT

## 2019-01-26 PROCEDURE — 86038 ANTINUCLEAR ANTIBODIES: CPT

## 2019-01-26 PROCEDURE — 80053 COMPREHEN METABOLIC PANEL: CPT

## 2019-01-26 PROCEDURE — 82164 ANGIOTENSIN I ENZYME TEST: CPT

## 2019-01-26 PROCEDURE — 85025 COMPLETE CBC W/AUTO DIFF WBC: CPT

## 2019-01-26 PROCEDURE — 84443 ASSAY THYROID STIM HORMONE: CPT

## 2019-01-28 PROBLEM — M70.62 GREATER TROCHANTERIC BURSITIS OF LEFT HIP: Status: ACTIVE | Noted: 2019-01-28

## 2019-01-28 NOTE — ASSESSMENT & PLAN NOTE
"Lateral left hip pain ongoing for greater than a month now.  She has been training for half marathon, has been running up to 6 miles on a regular basis, she has not adjusted her exercise regimen since the hip started bothering her.  States that she has been trying to \"push through\" the discomfort.  The hip hurts both with running, lying on her left side at night.  She is not taking any medication.  No history of injury.  No change in range of motion, sticking or locking of the joint  "

## 2019-01-28 NOTE — PROGRESS NOTES
"Subjective:     Chief Complaint   Patient presents with   • Hip Pain     LEFT SIDE    • Labs Only     REQ THYROID     Maria Luisa Elva Dodge is a 23 y.o. female here today to follow up on:    Lightheadedness, headache, vision concern  She has had ongoing concerns of sudden episodes of lightheadedness which are followed by vision distortion and headache.  These come on without warning, typically lasts 10-15 minutes and then resolve spontaneously.  Her vision is \"pixilated\" during these episodes.  Her headache is fairly severe.  This is occurring about once a week and interferes with her activity, she had an episode driving and had to pull over  She denies nausea, vomiting, disorientation, gait difficulty, shortness of breath, tachycardia, presyncope.  She has had minor headaches in the past, no diagnosed history of migraine  She does have history of hypothyroidism, was on levothyroxine at one point but has been off medication now for quite some time.  No significant weight change, fatigue.  She does state that she is \"always cold\"  Mother has history of sarcoidosis    Greater trochanteric bursitis of left hip  Lateral left hip pain ongoing for greater than a month now.  She has been training for half marathon, has been running up to 6 miles on a regular basis, she has not adjusted her exercise regimen since the hip started bothering her.  States that she has been trying to \"push through\" the discomfort.  The hip hurts both with running, lying on her left side at night.  She is not taking any medication.  No history of injury.  No change in range of motion, sticking or locking of the joint       Current medicines (including changes today)  Current Outpatient Prescriptions   Medication Sig Dispense Refill   • valACYclovir (VALTREX) 500 MG Tab Take 1 Tab by mouth 2 times a day. 60 Tab 5   • norethindrone-ethinyl estradiol-iron (MICROGESTIN FE1.5/30) 1.5-30 MG-MCG tablet Take 1 Tab by mouth every day. 28 Tab 5     No " "current facility-administered medications for this visit.      She  has a past medical history of ASTHMA; Ovarian cyst; Thyroid disease; and Urinary tract infection, site not specified. She also has no past medical history of Diabetes or GERD (gastroesophageal reflux disease).    ROS included above     Objective:     Blood pressure 136/86, pulse 87, temperature 36.6 °C (97.8 °F), temperature source Temporal, resp. rate 16, height 1.575 m (5' 2\"), weight 63.5 kg (140 lb), SpO2 98 %, not currently breastfeeding. Body mass index is 25.61 kg/m².     Physical Exam:  General: Alert, oriented in no acute distress.  Eye contact is good, speech is normal, affect calm  HEENT: EOMI, Jr, oral mucosa pink moist, no lesions. TMs gray with good landmarks bilaterally. No lymphadenopathy.  Lungs: clear to auscultation bilaterally, normal effort, no wheeze/ rhonchi/ rales.  CV: regular rate and rhythm, S1, S2, no murmur  Abdomen: soft, nontender  MS: Tenderness over the left trochanteric bursa.  Hip with full range of motion, normal gait, no tenderness over the lumbar spine  Neuro: DTR 2+ in bilateral lower extremities.  Negative Romberg  Ext: no edema, color normal, vascularity normal, temperature normal    Assessment and Plan:   The following treatment plan was discussed   1. Migraine syndrome  Her complaints of sudden episodes of lightheadedness followed by vision distortion and headache are suggestive of a migraine syndrome.  These episodes have been short-lived which may make abortive medications of limited value for her.  Will obtain screening labs as listed below, may consider preventative therapy pending results.  Options briefly reviewed today.   2. Lightheadedness  COMP METABOLIC PANEL    CBC WITH DIFFERENTIAL    WESTERGREN SED RATE    SARATH REFLEXIVE PROFILE   3. Frequent headaches  WESTERGREN SED RATE    SARATH REFLEXIVE PROFILE   4. Greater trochanteric bursitis of left hip   exam today consistent with trochanteric " bursitis.  Advised to significantly reduce her running over the next few weeks and allow the hip to rest, Aleve twice daily for 7-10 days.  Would consider physical therapy referral or bursa injection if pain persists   5. History of hypothyroidism  TSH+FREE T4    THYROID PEROXIDASE  (TPO) AB    TRIIDOTHYRONINE   6. Family history of sarcoidosis  ANGIOTENSIN I CONVERTING ENZYME       Followup: Pending labs         Please note that this dictation was created using voice recognition software. I have worked with consultants from the vendor as well as technical experts from [employer] to optimize the interface. I have made every reasonable attempt to correct obvious errors, but I expect that there are errors of grammar and possibly content that I did not discover before finalizing the note.

## 2019-01-29 ENCOUNTER — TELEPHONE (OUTPATIENT)
Dept: MEDICAL GROUP | Facility: MEDICAL CENTER | Age: 24
End: 2019-01-29

## 2019-01-29 DIAGNOSIS — R79.9 ABNORMAL BLOOD CELL COUNT: ICD-10-CM

## 2019-01-29 LAB
ACE SERPL-CCNC: 16 U/L (ref 9–67)
NUCLEAR IGG SER QL IA: NORMAL

## 2019-01-29 NOTE — TELEPHONE ENCOUNTER
----- Message from RUBEN Camarena sent at 1/29/2019 11:40 AM PST -----  Please f/u with patient by phone to ensure mychart message is received    Good morning Maria Luisa,   Overall your labs are showing no cause for your symptoms.  There are some abnormalities in your blood counts, I would like to look into this further with iron studies and vitamin B12 level.  I have placed further lab orders so that you may return at your convenience.  SARATH test, marker for autoimmune disease, is negative.  Yvrose FAN

## 2019-02-16 ENCOUNTER — HOSPITAL ENCOUNTER (OUTPATIENT)
Dept: LAB | Facility: MEDICAL CENTER | Age: 24
End: 2019-02-16
Attending: NURSE PRACTITIONER
Payer: COMMERCIAL

## 2019-02-16 DIAGNOSIS — R79.9 ABNORMAL BLOOD CELL COUNT: ICD-10-CM

## 2019-02-16 LAB
FOLATE SERPL-MCNC: 17.7 NG/ML
IRON SATN MFR SERPL: 45 % (ref 15–55)
IRON SERPL-MCNC: 158 UG/DL (ref 40–170)
TIBC SERPL-MCNC: 350 UG/DL (ref 250–450)
VIT B12 SERPL-MCNC: 389 PG/ML (ref 211–911)

## 2019-02-16 PROCEDURE — 83540 ASSAY OF IRON: CPT

## 2019-02-16 PROCEDURE — 82746 ASSAY OF FOLIC ACID SERUM: CPT

## 2019-02-16 PROCEDURE — 83550 IRON BINDING TEST: CPT

## 2019-02-16 PROCEDURE — 36415 COLL VENOUS BLD VENIPUNCTURE: CPT

## 2019-02-16 PROCEDURE — 82607 VITAMIN B-12: CPT

## 2019-03-18 ENCOUNTER — OFFICE VISIT (OUTPATIENT)
Dept: URGENT CARE | Facility: PHYSICIAN GROUP | Age: 24
End: 2019-03-18
Payer: COMMERCIAL

## 2019-03-18 VITALS
WEIGHT: 140 LBS | OXYGEN SATURATION: 98 % | BODY MASS INDEX: 25.61 KG/M2 | TEMPERATURE: 98.2 F | SYSTOLIC BLOOD PRESSURE: 122 MMHG | HEART RATE: 76 BPM | RESPIRATION RATE: 14 BRPM | DIASTOLIC BLOOD PRESSURE: 84 MMHG

## 2019-03-18 DIAGNOSIS — N30.01 ACUTE CYSTITIS WITH HEMATURIA: ICD-10-CM

## 2019-03-18 LAB
APPEARANCE UR: CLEAR
BILIRUB UR STRIP-MCNC: NORMAL MG/DL
COLOR UR AUTO: NORMAL
GLUCOSE UR STRIP.AUTO-MCNC: 100 MG/DL
KETONES UR STRIP.AUTO-MCNC: NORMAL MG/DL
LEUKOCYTE ESTERASE UR QL STRIP.AUTO: NORMAL
NITRITE UR QL STRIP.AUTO: POSITIVE
PH UR STRIP.AUTO: 7 [PH] (ref 5–8)
PROT UR QL STRIP: 30 MG/DL
RBC UR QL AUTO: NORMAL
SP GR UR STRIP.AUTO: 1.01
UROBILINOGEN UR STRIP-MCNC: 0.2 MG/DL

## 2019-03-18 PROCEDURE — 99214 OFFICE O/P EST MOD 30 MIN: CPT | Performed by: NURSE PRACTITIONER

## 2019-03-18 PROCEDURE — 81002 URINALYSIS NONAUTO W/O SCOPE: CPT | Performed by: NURSE PRACTITIONER

## 2019-03-18 RX ORDER — NITROFURANTOIN 25; 75 MG/1; MG/1
100 CAPSULE ORAL EVERY 12 HOURS
Qty: 10 CAP | Refills: 0 | Status: SHIPPED | OUTPATIENT
Start: 2019-03-18 | End: 2019-03-23

## 2019-03-18 ASSESSMENT — ENCOUNTER SYMPTOMS
FEVER: 0
MYALGIAS: 0
SORE THROAT: 0
VOMITING: 0
SHORTNESS OF BREATH: 0
CHILLS: 0
EYE PAIN: 0
DIZZINESS: 0
FLANK PAIN: 0
SWEATS: 0
NAUSEA: 0

## 2019-03-19 NOTE — PROGRESS NOTES
Subjective:   Maria Luisa Dodge is a 23 y.o. female who presents for Urinary Frequency (x 2 days ) and Urinary Pain (x 2 days )        Dysuria    This is a new problem. Episode onset: 2 days. The problem occurs every urination. The problem has been unchanged. The quality of the pain is described as burning. The pain is at a severity of 1/10. The pain is mild. There has been no fever. She is sexually active. There is no history of pyelonephritis. Associated symptoms include frequency and urgency. Pertinent negatives include no chills, discharge, flank pain, hematuria, hesitancy, nausea, possible pregnancy, sweats or vomiting. She has tried nothing for the symptoms. The treatment provided no relief. There is no history of kidney stones or recurrent UTIs.     Review of Systems   Constitutional: Negative for chills and fever.   HENT: Negative for sore throat.    Eyes: Negative for pain.   Respiratory: Negative for shortness of breath.    Cardiovascular: Negative for chest pain.   Gastrointestinal: Negative for nausea and vomiting.   Genitourinary: Positive for dysuria, frequency and urgency. Negative for flank pain, hematuria and hesitancy.   Musculoskeletal: Negative for myalgias.   Skin: Negative for rash.   Neurological: Negative for dizziness.     Allergies   Allergen Reactions   • Codeine Vomiting      Objective:   /84   Pulse 76   Temp 36.8 °C (98.2 °F) (Temporal)   Resp 14   Wt 63.5 kg (140 lb)   SpO2 98%   BMI 25.61 kg/m²   Physical Exam   Constitutional: She is oriented to person, place, and time. She appears well-developed and well-nourished. No distress.   HENT:   Head: Normocephalic and atraumatic.   Eyes: Pupils are equal, round, and reactive to light. Conjunctivae and EOM are normal.   Cardiovascular: Normal rate and regular rhythm.    No murmur heard.  Pulmonary/Chest: Effort normal and breath sounds normal. No respiratory distress.   Abdominal: Soft. She exhibits no distension. There  is no tenderness. There is no CVA tenderness.   Neurological: She is alert and oriented to person, place, and time. She has normal reflexes. No sensory deficit.   Skin: Skin is warm and dry.   Psychiatric: She has a normal mood and affect.         Assessment/Plan:       1. Acute cystitis with hematuria  POCT Urinalysis    nitrofurantoin monohydr macro (MACROBID) 100 MG Cap     Lab Results   Component Value Date/Time    POCCOLOR yellow 10/18/2018 01:25 PM    POCAPPEAR clear 10/18/2018 01:25 PM    POCLEUKEST small 10/18/2018 01:25 PM    POCNITRITE negative 10/18/2018 01:25 PM    POCUROBILIGE 0.2 10/18/2018 01:25 PM    POCPROTEIN negative 10/18/2018 01:25 PM    POCURPH 6.0 10/18/2018 01:25 PM    POCBLOOD moderate 10/18/2018 01:25 PM    POCSPGRV 1.015 10/18/2018 01:25 PM    POCKETONES negative 10/18/2018 01:25 PM    POCBILIRUBIN negative 10/18/2018 01:25 PM    POCGLUCUA negative 10/18/2018 01:25 PM      Pt. Was given ABX therapy todayER precautions given- worsening symptoms, back pain, abd. Pain, or fevers.   Pt. Is to increase fluids, and take the complete duration of the therapy.   Pt. Understands and agrees with the plan.   Patient was noted to have a small amount of glucose in urine recommended follow-up with PCP.  Patient given precautionary s/sx that mandate immediate follow up and evaluation in the ED. Advised of risks of not doing so.    DDX, Supportive care, and indications for immediate follow-up discussed with patient.    Instructed to return to clinic or nearest emergency department if we are not available for any change in condition, further concerns, or worsening of symptoms.    The patient demonstrated a good understanding and agreed with the treatment plan.

## 2019-03-26 RX ORDER — VALACYCLOVIR HYDROCHLORIDE 500 MG/1
500 TABLET, FILM COATED ORAL 2 TIMES DAILY
Qty: 60 TAB | Refills: 5 | Status: SHIPPED | OUTPATIENT
Start: 2019-03-26 | End: 2019-11-22 | Stop reason: SDUPTHER

## 2019-04-23 ENCOUNTER — PATIENT MESSAGE (OUTPATIENT)
Dept: MEDICAL GROUP | Facility: MEDICAL CENTER | Age: 24
End: 2019-04-23

## 2019-04-23 NOTE — TELEPHONE ENCOUNTER
From: Maria Luisa Dodge  To: RUBEN Camarena  Sent: 4/23/2019 5:19 AM PDT  Subject: Non-Urgent Medical Question    Good morning,  I believe I have another UTI. I have all the usual symptoms (pain with urination, frequent urination) and I have been taking AZO since Sunday to help relieve my symptoms. This is my 4th one since about November I believe. Should I go to urgent care again or should I try and make an appointment with you? Or should I just try and ride it out? Since they keep coming back I'm wondering if the antibiotics are actually working.     Thank you,  Chichi

## 2019-04-24 ENCOUNTER — OFFICE VISIT (OUTPATIENT)
Dept: MEDICAL GROUP | Facility: MEDICAL CENTER | Age: 24
End: 2019-04-24
Payer: COMMERCIAL

## 2019-04-24 ENCOUNTER — HOSPITAL ENCOUNTER (OUTPATIENT)
Facility: MEDICAL CENTER | Age: 24
End: 2019-04-24
Attending: NURSE PRACTITIONER
Payer: COMMERCIAL

## 2019-04-24 VITALS
OXYGEN SATURATION: 97 % | SYSTOLIC BLOOD PRESSURE: 112 MMHG | WEIGHT: 138 LBS | HEIGHT: 62 IN | TEMPERATURE: 97.8 F | RESPIRATION RATE: 16 BRPM | BODY MASS INDEX: 25.4 KG/M2 | HEART RATE: 79 BPM | DIASTOLIC BLOOD PRESSURE: 88 MMHG

## 2019-04-24 DIAGNOSIS — R35.0 URINARY FREQUENCY: ICD-10-CM

## 2019-04-24 DIAGNOSIS — R30.0 DYSURIA: ICD-10-CM

## 2019-04-24 PROCEDURE — 87086 URINE CULTURE/COLONY COUNT: CPT

## 2019-04-24 PROCEDURE — 99214 OFFICE O/P EST MOD 30 MIN: CPT | Performed by: NURSE PRACTITIONER

## 2019-04-24 RX ORDER — SULFAMETHOXAZOLE AND TRIMETHOPRIM 800; 160 MG/1; MG/1
1 TABLET ORAL 2 TIMES DAILY
Qty: 6 TAB | Refills: 0 | Status: SHIPPED
Start: 2019-04-24 | End: 2020-02-19

## 2019-04-24 RX ORDER — SULFAMETHOXAZOLE AND TRIMETHOPRIM 800; 160 MG/1; MG/1
1 TABLET ORAL 2 TIMES DAILY
Qty: 6 TAB | Refills: 0 | Status: SHIPPED | OUTPATIENT
Start: 2019-04-24 | End: 2019-04-24 | Stop reason: SDUPTHER

## 2019-04-24 NOTE — PROGRESS NOTES
"Subjective:     Chief Complaint   Patient presents with   • UTI     X3 DAYS      Maria Luisa Dodge is a 23 y.o. female established patient here for evaluation of dysuria and urinary leave.  Current episode started 3 days ago, has been persistent.  She started taking Azo today which does help slightly with symptoms.  She is concerned that this is her fourth episode over the last several months.  She has not identified any triggers.  She is staying well-hydrated, urinating after intercourse, she is emptying her bladder regularly.  Unfortunately, and her prior visits there was not a urine culture completed.  No fever, chills, back pain, nausea    No problem-specific Assessment & Plan notes found for this encounter.       Current medicines (including changes today)  Current Outpatient Prescriptions   Medication Sig Dispense Refill   • sulfamethoxazole-trimethoprim (BACTRIM DS) 800-160 MG tablet Take 1 Tab by mouth 2 times a day. 6 Tab 0   • valACYclovir (VALTREX) 500 MG Tab Take 1 Tab by mouth 2 times a day. 60 Tab 5   • norethindrone-ethinyl estradiol-iron (MICROGESTIN FE1.5/30) 1.5-30 MG-MCG tablet Take 1 Tab by mouth every day. 28 Tab 5     No current facility-administered medications for this visit.      She  has a past medical history of ASTHMA; Ovarian cyst; Thyroid disease; and Urinary tract infection, site not specified. She also has no past medical history of Diabetes or GERD (gastroesophageal reflux disease).    ROS included above     Objective:     /88 (BP Location: Left arm, Patient Position: Sitting, BP Cuff Size: Adult)   Pulse 79   Temp 36.6 °C (97.8 °F) (Temporal)   Resp 16   Ht 1.575 m (5' 2\")   Wt 62.6 kg (138 lb)   SpO2 97%  Body mass index is 25.24 kg/m².     Physical Exam:  General: Alert, oriented in no acute distress.  Eye contact is good, speech is normal, affect calm  Lungs: clear to auscultation bilaterally, normal effort, no wheeze/ rhonchi/ rales.  CV: regular rate and " rhythm, S1, S2, no murmur  Abdomen: soft, nontender  Ext: no edema, color normal, vascularity normal, temperature normal    Assessment and Plan:   The following treatment plan was discussed   1. Dysuria   this is her fourth visit in the last several months with concerns of UTI symptoms (seen at locations outside of our system).  Unfortunately we have no current urine culture results available.  We will start her on Bactrim for her current symptoms, complete culture.  I will follow-up with her with results.  If negative I will refer to urology  URINE CULTURE(NEW)    sulfamethoxazole-trimethoprim (BACTRIM DS) 800-160 MG tablet       2. Urinary frequency         Followup: Pending culture         Please note that this dictation was created using voice recognition software. I have worked with consultants from the vendor as well as technical experts from [employer] [facility] [employer] to optimize the interface. I have made every reasonable attempt to correct obvious errors, but I expect that there are errors of grammar and possibly content that I did not discover before finalizing the note.

## 2019-04-25 DIAGNOSIS — R30.0 DYSURIA: ICD-10-CM

## 2019-04-27 LAB
BACTERIA UR CULT: NORMAL
SIGNIFICANT IND 70042: NORMAL
SITE SITE: NORMAL
SOURCE SOURCE: NORMAL

## 2019-04-28 ENCOUNTER — PATIENT MESSAGE (OUTPATIENT)
Dept: MEDICAL GROUP | Facility: MEDICAL CENTER | Age: 24
End: 2019-04-28

## 2019-04-28 DIAGNOSIS — R30.0 DYSURIA: ICD-10-CM

## 2019-04-29 NOTE — TELEPHONE ENCOUNTER
From: Maria Luisa Dodge  To: RUBEN Camarena  Sent: 4/28/2019 3:02 PM PDT  Subject: Test Result Question    Thank you for getting back to me about my results. You asked me to let you know how I am feeling... I have completed the antibiotics you prescribed for me. I am no longer feeling pain when I use the bathroom, but I am still feeling the symptoms of frequent urination, but overall I'm feeling better for now. I am also taking a probiotic and a cranberry supplement to help. Hopefully the urologist accepts my insurance- [employer]. Thank you!

## 2019-09-30 ENCOUNTER — PATIENT MESSAGE (OUTPATIENT)
Dept: MEDICAL GROUP | Facility: MEDICAL CENTER | Age: 24
End: 2019-09-30

## 2019-11-22 RX ORDER — VALACYCLOVIR HYDROCHLORIDE 500 MG/1
TABLET, FILM COATED ORAL
Qty: 60 TAB | Refills: 2 | Status: SHIPPED | OUTPATIENT
Start: 2019-11-22 | End: 2020-03-25

## 2020-02-19 ENCOUNTER — OFFICE VISIT (OUTPATIENT)
Dept: MEDICAL GROUP | Facility: MEDICAL CENTER | Age: 25
End: 2020-02-19
Payer: COMMERCIAL

## 2020-02-19 ENCOUNTER — HOSPITAL ENCOUNTER (OUTPATIENT)
Dept: LAB | Facility: MEDICAL CENTER | Age: 25
End: 2020-02-19
Attending: NURSE PRACTITIONER
Payer: COMMERCIAL

## 2020-02-19 VITALS
HEART RATE: 89 BPM | OXYGEN SATURATION: 97 % | DIASTOLIC BLOOD PRESSURE: 70 MMHG | SYSTOLIC BLOOD PRESSURE: 112 MMHG | HEIGHT: 62 IN | TEMPERATURE: 97.2 F | WEIGHT: 135.8 LBS | RESPIRATION RATE: 16 BRPM | BODY MASS INDEX: 24.99 KG/M2

## 2020-02-19 DIAGNOSIS — Z86.39 HISTORY OF HYPOTHYROIDISM: ICD-10-CM

## 2020-02-19 DIAGNOSIS — N92.6 IRREGULAR MENSES: ICD-10-CM

## 2020-02-19 DIAGNOSIS — Z31.9 PATIENT DESIRES PREGNANCY: ICD-10-CM

## 2020-02-19 LAB
T4 FREE SERPL-MCNC: 0.88 NG/DL (ref 0.53–1.43)
TESTOST SERPL-MCNC: <10 NG/DL (ref 9–75)
TSH SERPL DL<=0.005 MIU/L-ACNC: 6.57 UIU/ML (ref 0.38–5.33)

## 2020-02-19 PROCEDURE — 84439 ASSAY OF FREE THYROXINE: CPT

## 2020-02-19 PROCEDURE — 84443 ASSAY THYROID STIM HORMONE: CPT

## 2020-02-19 PROCEDURE — 84403 ASSAY OF TOTAL TESTOSTERONE: CPT

## 2020-02-19 PROCEDURE — 83520 IMMUNOASSAY QUANT NOS NONAB: CPT

## 2020-02-19 PROCEDURE — 99214 OFFICE O/P EST MOD 30 MIN: CPT | Performed by: NURSE PRACTITIONER

## 2020-02-19 PROCEDURE — 36415 COLL VENOUS BLD VENIPUNCTURE: CPT

## 2020-02-19 ASSESSMENT — PATIENT HEALTH QUESTIONNAIRE - PHQ9: CLINICAL INTERPRETATION OF PHQ2 SCORE: 0

## 2020-02-19 NOTE — PROGRESS NOTES
"Subjective:     Chief Complaint   Patient presents with   • Labs Only     lab request for thyroid + PCOS     Maria Luisa Dodge is a 24 y.o. female established patient here to discuss fertility concerns.  She has been actively trying for pregnancy for 7 months with no contraception, has not yet conceived.  She was seen last week by her OB/GYN who suggested she follow-up with primary care for lab work.  She does have history of thyroid disorder, was on a low dose of medication until approximately 16 years old.  Her last labs in 2018 showed normal TSH at 2.33  She states that her menstrual cycle has always been a regular when off of birth control.  She is currently having cycles ranging from 28 to 45 days.  Bleeding is typically 3 to 4-day duration, moderate.  She had been told as a teenager that she had ovarian cysts and questions if this could be an indicator of PCOS.  She denies any symptoms of hirsutism.  No weight gain, no history of elevated glucose.  No family history of PCOS that she is aware of.  She does state that her sister has endometriosis.  No problem-specific Assessment & Plan notes found for this encounter.       Current medicines (including changes today)  Current Outpatient Medications   Medication Sig Dispense Refill   • valACYclovir (VALTREX) 500 MG Tab TAKE ONE TABLET BY MOUTH TWICE DAILY  60 Tab 2     No current facility-administered medications for this visit.      She  has a past medical history of ASTHMA, Ovarian cyst, Thyroid disease, and Urinary tract infection, site not specified. She also has no past medical history of Diabetes or GERD (gastroesophageal reflux disease).    ROS included above     Objective:     /70 (BP Location: Left arm, Patient Position: Sitting, BP Cuff Size: Adult)   Pulse 89   Temp 36.2 °C (97.2 °F) (Temporal)   Resp 16   Ht 1.575 m (5' 2\")   Wt 61.6 kg (135 lb 12.9 oz)   SpO2 97%  Body mass index is 24.84 kg/m².     Physical Exam:  General: Alert, " oriented in no acute distress.  Eye contact is good, speech is normal, affect calm  Lungs: clear to auscultation bilaterally, normal effort, no wheeze/ rhonchi/ rales.  CV: regular rate and rhythm, S1, S2, no murmur  Abdomen: soft, nontender  Ext: no edema, color normal, vascularity normal, temperature normal    Assessment and Plan:   The following treatment plan was discussed   1. Patient desires pregnancy   patient has been trying for pregnancy 7 months without success.  For some reason her OB/GYN has referred her to primary care for evaluation.  We have discussed that even with normal fertility it can take up to 1 year to conceive, reassurance given.  We will obtain labs and ultrasound as listed below, follow-up pending results   2. History of hypothyroidism  US-PELVIC TRANSVAGINAL ONLY    ANTI-MULLERIAN HORMONE([facility])   3. Irregular menses  TSH+FREE T4    TESTOSTERONE SERUM    US-PELVIC TRANSVAGINAL ONLY    ANTI-MULLERIAN HORMONE([facility])       Followup: Pending tests         Please note that this dictation was created using voice recognition software. I have worked with consultants from the vendor as well as technical experts from [employer] to optimize the interface. I have made every reasonable attempt to correct obvious errors, but I expect that there are errors of grammar and possibly content that I did not discover before finalizing the note.

## 2020-02-22 ENCOUNTER — PATIENT MESSAGE (OUTPATIENT)
Dept: MEDICAL GROUP | Facility: MEDICAL CENTER | Age: 25
End: 2020-02-22

## 2020-02-22 DIAGNOSIS — R79.89 ELEVATED TSH: ICD-10-CM

## 2020-02-22 LAB — MIS SERPL-MCNC: 4.28 NG/ML (ref 0.4–16.02)

## 2020-02-29 ENCOUNTER — PATIENT MESSAGE (OUTPATIENT)
Dept: MEDICAL GROUP | Facility: MEDICAL CENTER | Age: 25
End: 2020-02-29

## 2020-03-01 NOTE — TELEPHONE ENCOUNTER
"From: Maria Luisa Dodge  To: RUBEN Camarena  Sent: 2/29/2020 9:10 AM PST  Subject: Test Result Question    Good morning,     Will my lab order be in the \"system?\" Or will I need to print something out and bring it?     Thank you,  Chichi       ----- Message -----   From:RUBEN Camarena   Sent:2/23/2020 8:29 AM PST   To:Maria Luisa Dodge   Subject:RE: Test Result Question    Luis Alberto Glass,  I just reviewed your labs. Your thyroid is slightly underfunctioning. Yes, this can contribute to irregular periods. The abnormality is minor, so I would like to recheck in a few weeks then have an appointment to go over it and discuss treatment if needed. I have placed another lab order for you, I recommend you have that done in 3 weeks or so and then schedule an appointment.  Talk to you soon,  Yvrose FAN      ----- Message -----   From:Maria Luisa Dodge   Sent:2/22/2020 2:19 PM PST   To:RUBEN Camarena   Subject:Test Result Question    I have a question about TSH resulted on 2/19/20, 7:29 PM.    Should I make an appointment to discuss this? Maybe this is why I have irregular periods?  "

## 2020-03-14 ENCOUNTER — HOSPITAL ENCOUNTER (OUTPATIENT)
Dept: RADIOLOGY | Facility: MEDICAL CENTER | Age: 25
End: 2020-03-14
Attending: NURSE PRACTITIONER
Payer: COMMERCIAL

## 2020-03-14 DIAGNOSIS — N92.6 IRREGULAR MENSES: ICD-10-CM

## 2020-03-14 DIAGNOSIS — Z86.39 HISTORY OF HYPOTHYROIDISM: ICD-10-CM

## 2020-03-14 PROCEDURE — 76830 TRANSVAGINAL US NON-OB: CPT

## 2020-03-25 RX ORDER — VALACYCLOVIR HYDROCHLORIDE 500 MG/1
TABLET, FILM COATED ORAL
Qty: 60 TAB | Refills: 0 | Status: SHIPPED | OUTPATIENT
Start: 2020-03-25 | End: 2020-05-11

## 2020-05-11 RX ORDER — VALACYCLOVIR HYDROCHLORIDE 500 MG/1
TABLET, FILM COATED ORAL
Qty: 60 TAB | Refills: 0 | Status: SHIPPED | OUTPATIENT
Start: 2020-05-11 | End: 2020-06-26

## 2020-06-05 ENCOUNTER — OFFICE VISIT (OUTPATIENT)
Dept: URGENT CARE | Facility: PHYSICIAN GROUP | Age: 25
End: 2020-06-05
Payer: COMMERCIAL

## 2020-06-05 VITALS
WEIGHT: 136 LBS | OXYGEN SATURATION: 98 % | RESPIRATION RATE: 16 BRPM | SYSTOLIC BLOOD PRESSURE: 118 MMHG | TEMPERATURE: 98.9 F | HEART RATE: 78 BPM | DIASTOLIC BLOOD PRESSURE: 68 MMHG | HEIGHT: 62 IN | BODY MASS INDEX: 25.03 KG/M2
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
## 2020-06-05 PROCEDURE — 99214 OFFICE O/P EST MOD 30 MIN: CPT | Performed by: PHYSICIAN ASSISTANT

## 2020-06-05 RX ORDER — CYCLOBENZAPRINE HCL 5 MG
5-10 TABLET ORAL 3 TIMES DAILY PRN
Qty: 15 TAB | Refills: 0 | Status: SHIPPED | OUTPATIENT
Start: 2020-06-05

## 2020-06-05 ASSESSMENT — ENCOUNTER SYMPTOMS
LEG PAIN: 0
DIZZINESS: 0
SYNCOPE: 0
LOSS OF CONSCIOUSNESS: 0
TROUBLE SWALLOWING: 0
PALPITATIONS: 0
FOCAL WEAKNESS: 0
WEAKNESS: 0
EYE PAIN: 0
PARESIS: 0
SHORTNESS OF BREATH: 0
TREMORS: 0
TINGLING: 0
FEVER: 0
NECK PAIN: 1
PHOTOPHOBIA: 0
VISUAL CHANGE: 0
NUMBNESS: 0
HEADACHES: 0
SENSORY CHANGE: 0
BLURRED VISION: 0
ABDOMINAL PAIN: 0
DOUBLE VISION: 0
ROS SKIN COMMENTS: NO SEATBELT SIGN
SPEECH CHANGE: 0

## 2020-06-05 ASSESSMENT — FIBROSIS 4 INDEX: FIB4 SCORE: .5106882308569509898
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
## 2020-06-26 RX ORDER — VALACYCLOVIR HYDROCHLORIDE 500 MG/1
TABLET, FILM COATED ORAL
Qty: 60 TAB | Refills: 0 | Status: SHIPPED | OUTPATIENT
Start: 2020-06-26 | End: 2020-08-17

## 2020-08-17 RX ORDER — VALACYCLOVIR HYDROCHLORIDE 500 MG/1
TABLET, FILM COATED ORAL
Qty: 60 TAB | Refills: 0 | Status: SHIPPED | OUTPATIENT
Start: 2020-08-17 | End: 2020-09-29

## 2020-08-17 NOTE — TELEPHONE ENCOUNTER
Received request via: Pharmacy    Was the patient seen in the last year in this department? Yes    Does the patient have an active prescription (recently filled or refills available) for medication(s) requested? No, ran out on 7/26

## 2020-09-29 RX ORDER — VALACYCLOVIR HYDROCHLORIDE 500 MG/1
TABLET, FILM COATED ORAL
Qty: 60 TAB | Refills: 0 | Status: SHIPPED | OUTPATIENT
Start: 2020-09-29 | End: 2020-11-02

## 2020-11-02 RX ORDER — VALACYCLOVIR HYDROCHLORIDE 500 MG/1
TABLET, FILM COATED ORAL
Qty: 60 TAB | Refills: 5 | Status: SHIPPED | OUTPATIENT
Start: 2020-11-02 | End: 2021-11-19

## 2021-01-18 ENCOUNTER — APPOINTMENT (OUTPATIENT)
Dept: MEDICAL GROUP | Facility: MEDICAL CENTER | Age: 26
End: 2021-01-18
Payer: COMMERCIAL

## 2021-05-05 ENCOUNTER — TELEPHONE (OUTPATIENT)
Dept: OBGYN | Facility: CLINIC | Age: 26
End: 2021-05-05

## 2021-05-05 NOTE — TELEPHONE ENCOUNTER
Pt called triage line stating she got a positive pregnancy test on 4/28/2021 and yesterday had light bleeding and today its heavy bleeding with gunk, her appt is on 6/7/2021 for confirmation, pt wants to know what she should do or if there is something she can take. Called pt unable to contact her left message to call back.   
- Standing colace  - Continue low residue diet    Madhavi Meza, PGY1  Pager #[phone]

## 2021-05-24 ENCOUNTER — TELEPHONE (OUTPATIENT)
Dept: MEDICAL GROUP | Facility: MEDICAL CENTER | Age: 26
End: 2021-05-24

## 2021-05-24 NOTE — TELEPHONE ENCOUNTER
Pt was called to re-schedule appt, no answer, was advised that Yvrose Corrales won't be in office and we can schedule her for another day or time and to let us know what works best for her and can send us a [employer] message, call us at [phone] or schedule on [employer].

## 2021-05-26 ENCOUNTER — APPOINTMENT (OUTPATIENT)
Dept: MEDICAL GROUP | Facility: MEDICAL CENTER | Age: 26
End: 2021-05-26
Payer: COMMERCIAL

## 2021-06-04 ENCOUNTER — OFFICE VISIT (OUTPATIENT)
Dept: MEDICAL GROUP | Facility: MEDICAL CENTER | Age: 26
End: 2021-06-04
Payer: COMMERCIAL

## 2021-06-04 VITALS
SYSTOLIC BLOOD PRESSURE: 110 MMHG | HEART RATE: 80 BPM | DIASTOLIC BLOOD PRESSURE: 70 MMHG | BODY MASS INDEX: 25.76 KG/M2 | OXYGEN SATURATION: 97 % | HEIGHT: 62 IN | TEMPERATURE: 97.5 F | WEIGHT: 139.99 LBS | RESPIRATION RATE: 16 BRPM

## 2021-06-04 DIAGNOSIS — Z87.09 HISTORY OF ASTHMA: ICD-10-CM

## 2021-06-04 DIAGNOSIS — R06.02 SHORTNESS OF BREATH: ICD-10-CM

## 2021-06-04 DIAGNOSIS — Z31.9 PATIENT DESIRES PREGNANCY: ICD-10-CM

## 2021-06-04 PROCEDURE — 99213 OFFICE O/P EST LOW 20 MIN: CPT | Performed by: NURSE PRACTITIONER

## 2021-06-04 RX ORDER — ALBUTEROL SULFATE 90 UG/1
2 AEROSOL, METERED RESPIRATORY (INHALATION) EVERY 4 HOURS PRN
Qty: 1 EACH | Refills: 0 | Status: SHIPPED | OUTPATIENT
Start: 2021-06-04

## 2021-06-04 ASSESSMENT — PATIENT HEALTH QUESTIONNAIRE - PHQ9: CLINICAL INTERPRETATION OF PHQ2 SCORE: 0

## 2021-06-04 NOTE — ASSESSMENT & PLAN NOTE
"Intermittent difficulty with shortness of breath, sensation that she cannot take in a full breath or get enough air.  She was having this problem last November which lasted for a few weeks but by the time that she scheduled an appointment it seemed to self resolve.  This time started about a month ago and has been persisting.  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  States that it feels like she is \"breathing out of a straw\"  She was diagnosed with asthma as a child, had used an albuterol inhaler at one point.  No recent illness including fever, chills, chest pain.  No coughing, no waking at night with symptoms she does not feel good  She does not feel that anxiety would be a contributing factor at this time  "

## 2021-06-04 NOTE — PROGRESS NOTES
"Subjective:     Chief Complaint   Patient presents with   • Advice Only     SOB x1m, asthma in childhood     Maria Luisa Dodge is a 25 y.o. female here today to follow up on:    Patient desires pregnancy  She has been trying for pregnancy for about a year and a half.  Had a positive pregnancy test at home 4/28 but then developed cramping and bleeding 5/5. Subsequently has taken 3 additional pregnancy tests which were negative.  She has an appointment Monday with OB/GYN    Shortness of breath  Intermittent difficulty with shortness of breath, sensation that she cannot take in a full breath or get enough air.  She was having this problem last November which lasted for a few weeks but by the time that she scheduled an appointment it seemed to self resolve.  This time started about a month ago and has been persisting.  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  States that it feels like she is \"breathing out of a straw\"  She was diagnosed with asthma as a child, had used an albuterol inhaler at one point.  No recent illness including fever, chills, chest pain.  No coughing, no waking at night with symptoms she does not feel good  She does not feel that anxiety would be a contributing factor at this time       Current medicines (including changes today)  Current Outpatient Medications   Medication Sig Dispense Refill   • albuterol 108 (90 Base) MCG/ACT Aero Soln inhalation aerosol Inhale 2 Puffs every four hours as needed for Shortness of Breath. 1 Each 0   • valACYclovir (VALTREX) 500 MG Tab TAKE ONE TABLET BY MOUTH TWICE DAILY  60 Tab 5   • cyclobenzaprine (FLEXERIL) 5 MG tablet Take 1-2 Tabs by mouth 3 times a day as needed. (Patient not taking: Reported on 6/4/2021) 15 Tab 0     No current facility-administered medications for this visit.     She  has a past medical history of ASTHMA, Ovarian cyst, Thyroid disease, and Urinary tract infection, site not " "specified. She also has no past medical history of Diabetes or GERD (gastroesophageal reflux disease).    ROS included above     Objective:     /70 (BP Location: Left arm, Patient Position: Sitting, BP Cuff Size: Adult)   Pulse 80   Temp 36.4 °C (97.5 °F) (Temporal)   Resp 16   Ht 1.575 m (5' 2\")   Wt 63.5 kg (139 lb 15.9 oz)   SpO2 97%  Body mass index is 25.6 kg/m².     Physical Exam:  General: Alert, oriented in no acute distress.  Eye contact is good, speech is normal, affect calm  Lungs: clear to auscultation bilaterally, normal effort, no wheeze/ rhonchi/ rales.  CV: regular rate and rhythm, S1, S2, no murmur  Ext: no edema, color normal, vascularity normal, temperature normal    Assessment and Plan:   The following treatment plan was discussed  1. Shortness of breath   chest tightness and sensation of shortness of breath or inability to feel her lungs for about a month.  Lungs are clear, well oxygenated on room air. Mother does have sarcoidosis and she personally was diagnosed with asthma as a child.  She is not having any significant coughing at this time.  We will evaluate chest x-ray, start trial of albuterol.  If no improvement may need additional testing.  She denies feelings of anxiety  DX-CHEST-2 VIEWS    albuterol 108 (90 Base) MCG/ACT Aero Soln inhalation aerosol   2. History of asthma  albuterol 108 (90 Base) MCG/ACT Aero Soln inhalation aerosol   3. Patient desires pregnancy   positive pregnancy test at home 4/28 then with vaginal bleeding starting 5/5 and subsequent negative tests. Sounds as though she had an early miscarriage, unfortunately.  She does have an appointment Monday with her OB/GYN and will let me know how this goes.       Followup: pending xray          Please note that this dictation was created using voice recognition software. I have worked with consultants from the vendor as well as technical experts from [employer] to optimize the interface. I have made every " reasonable attempt to correct obvious errors, but I expect that there are errors of grammar and possibly content that I did not discover before finalizing the note.

## 2021-06-04 NOTE — ASSESSMENT & PLAN NOTE
She has been trying for pregnancy for about a year and a half.  Had a positive pregnancy test at home 4/28 but then developed cramping and bleeding 5/5. Subsequently has taken 3 additional pregnancy tests which were negative.  She has an appointment Monday with OB/GYN

## 2021-11-19 RX ORDER — VALACYCLOVIR HYDROCHLORIDE 500 MG/1
TABLET, FILM COATED ORAL
Qty: 60 TABLET | Refills: 0 | Status: SHIPPED | OUTPATIENT
Start: 2021-11-19

## 2022-03-31 NOTE — TELEPHONE ENCOUNTER
From: Maria Luisa Dodge  To: RUBEN Camarena  Sent: 2/22/2020 2:19 PM PST  Subject: Test Result Question    I have a question about TSH resulted on 2/19/20, 7:29 PM.    Should I make an appointment to discuss this? Maybe this is why I have irregular periods?  
no